# Patient Record
Sex: MALE | Race: WHITE | NOT HISPANIC OR LATINO | Employment: FULL TIME | URBAN - METROPOLITAN AREA
[De-identification: names, ages, dates, MRNs, and addresses within clinical notes are randomized per-mention and may not be internally consistent; named-entity substitution may affect disease eponyms.]

---

## 2018-01-19 ENCOUNTER — OFFICE VISIT (OUTPATIENT)
Dept: SURGERY | Facility: CLINIC | Age: 38
End: 2018-01-19

## 2018-01-19 VITALS — HEIGHT: 71 IN | WEIGHT: 192 LBS | BODY MASS INDEX: 26.88 KG/M2 | OXYGEN SATURATION: 98 % | HEART RATE: 79 BPM

## 2018-01-19 DIAGNOSIS — K59.00 CONSTIPATION, UNSPECIFIED CONSTIPATION TYPE: Primary | ICD-10-CM

## 2018-01-19 PROCEDURE — 99203 OFFICE O/P NEW LOW 30 MIN: CPT | Performed by: SURGERY

## 2018-01-19 NOTE — PROGRESS NOTES
General Surgery  Initial Office Visit    CC: Constipation    HPI: The patient is a pleasant 37 y.o. year-old gentleman who presents today for evaluation of constipation that began acutely 2 weeks ago.  Before his symptoms began, he was very irregular and had bowel movements at least once to twice daily without having to strain or take any laxatives.  2 weeks ago, he states that he had an abrupt cessation of all bowel movements.  He stop smoking, began eating more beefy greens, and cut out all alcohol from his diet and hopes that it may encourage more regular bowel function.  This was unsuccessful, so he drank a bottle of magnesium citrate.  This elicited a small watery bowel movement 3 days ago, but again nothing since then.  He went to an urgent care center yesterday because of his concern and was told to begin taking MiraLAX daily.  He took this yesterday as well as this morning, and has had no relief yet.  He denies any blood in the stool when he had the liquidy bowel movement 3 days ago, and does not have much abdominal pain or pelvic pain.  He has never previously undergone colonoscopic evaluation.  He does report some unintentional weight loss of 18 pounds about 6 weeks ago due to stress, but has since been able to put that weight back on in the last month.  His paternal aunts just passed away from colon cancer at the age of 64, but he otherwise does not have any family history of colon cancer or inflammatory bowel disease.  He does not feel as though he was under any undue stress at the time of his change in his bowel habits.    Past Medical History: None    Past Surgical History: None    Medications: None regularly    Allergies: Penicillins (respiratory issues)    Family History: Mother with history of breast cancer, no family history of colorectal malignancy    Social History: Single, works as a  for [employer], smokes one half pack cigarettes per week for the last 20 years, social alcohol use  3-4 nights per week    ROS:   Constitutional: Positive for activity change, appetite change, chills, fever, fatigue, excess sweating, and unexpected weight change  HENT: Negative for hearing loss or runny nose  Eyes: Negative for vision changes or scleral icterus  Respiratory: Negative for cough or shortness of breath  Cardiovascular: Negative for chest pain or heart palpitations  Gastrointestinal: Positive for constipation, diarrhea, nausea, and rectal pain; Negative for abdominal pain, nausea, vomiting, melena, or hematochezia  Genitourinary: Positive for urinary frequency; Negative for hematuria or dysuria  Musculoskeletal: Positive for muscle pain; Negative for joint pain or back pain  Neurologic: Positive for dizziness and weakness; Negative for headaches  Psychiatric: Positive for decreased concentration; Negative for anxiety or depression  All other systems reviewed and negative    Physical Exam:  Vitals:    01/19/18 0811   Pulse: 79   SpO2: 98%     General: No acute distress, well-nourished & well-developed  HEAD: normocephalic, atraumatic  EYES: normal conjunctiva, sclera anicteric  EARS: grossly normal hearing  NECK: supple, no thyromegaly  CARDIOVASCULAR: regular rate and rhythm  RESPIRATORY: clear to auscultation bilaterally  GASTROINTESTINAL: soft, nontender, non-distended  MUSCULOSKELETAL: normal gait and station. No gross extremity abnormalities  PSYCHIATRIC: oriented x3, normal mood and affect    IMAGING:  None    ASSESSMENT & PLAN  Mr. Barrios is a 37-year-old gentleman with constipation of unknown etiology.  I encouraged him to continue MiraLAX use daily until he has resumption of normal bowel function and would also like to check a CT of the abdomen and pelvis given his unexplained weight loss about 6 weeks ago and acute onset of his constipation, to ensure that there is no signs of a narrowing or obstruction of the colon.  I will call him with the results of the CT scan in about a week, and  will be able to reassess his symptoms over the phone at that time.  He can follow-up with me as needed otherwise.    Gayatri Valiente MD  General and Endoscopic Surgery  [facility]    [address], [address]  [place], [place], [address]  P: [phone]  F: [phone]

## 2018-01-25 ENCOUNTER — APPOINTMENT (OUTPATIENT)
Dept: CT IMAGING | Facility: HOSPITAL | Age: 38
End: 2018-01-25
Attending: SURGERY

## 2018-01-25 ENCOUNTER — TELEPHONE (OUTPATIENT)
Dept: SURGERY | Facility: CLINIC | Age: 38
End: 2018-01-25

## 2018-01-25 NOTE — TELEPHONE ENCOUNTER
No that's OK< he does not need a CT scan if his symptoms have resolved! Please call and advise him of this and let the radiology department know the CT is cancelled. He can follow-up with me as needed.

## 2018-01-25 NOTE — TELEPHONE ENCOUNTER
Patient calling with resolved symptoms for 3 days. The patient states he has had a lot more energy, and would like to know if you think he should proceed with a CT scan today. Please advise.